# Patient Record
Sex: FEMALE | Race: BLACK OR AFRICAN AMERICAN | NOT HISPANIC OR LATINO | URBAN - METROPOLITAN AREA
[De-identification: names, ages, dates, MRNs, and addresses within clinical notes are randomized per-mention and may not be internally consistent; named-entity substitution may affect disease eponyms.]

---

## 2021-03-04 ENCOUNTER — OFFICE (OUTPATIENT)
Dept: URBAN - METROPOLITAN AREA CLINIC 11 | Facility: CLINIC | Age: 56
End: 2021-03-04

## 2021-03-04 VITALS
HEART RATE: 90 BPM | HEIGHT: 64 IN | OXYGEN SATURATION: 98 % | WEIGHT: 185 LBS | DIASTOLIC BLOOD PRESSURE: 78 MMHG | SYSTOLIC BLOOD PRESSURE: 122 MMHG

## 2021-03-04 DIAGNOSIS — R10.30 LOWER ABDOMINAL PAIN, UNSPECIFIED: ICD-10-CM

## 2021-03-04 DIAGNOSIS — R13.19 OTHER DYSPHAGIA: ICD-10-CM

## 2021-03-04 DIAGNOSIS — R19.5 OTHER FECAL ABNORMALITIES: ICD-10-CM

## 2021-03-04 DIAGNOSIS — K59.00 CONSTIPATION, UNSPECIFIED: ICD-10-CM

## 2021-03-04 DIAGNOSIS — D50.9 IRON DEFICIENCY ANEMIA, UNSPECIFIED: ICD-10-CM

## 2021-03-04 LAB
CBC, PLATELET, NO DIFFERENTIAL: HEMATOCRIT: 45.4 % (ref 34–46.6)
CBC, PLATELET, NO DIFFERENTIAL: HEMOGLOBIN: 15.2 G/DL (ref 11.1–15.9)
CBC, PLATELET, NO DIFFERENTIAL: MCH: 34 PG — HIGH (ref 26.6–33)
CBC, PLATELET, NO DIFFERENTIAL: MCHC: 33.5 G/DL (ref 31.5–35.7)
CBC, PLATELET, NO DIFFERENTIAL: MCV: 102 FL — HIGH (ref 79–97)
CBC, PLATELET, NO DIFFERENTIAL: PLATELETS: 189 X10E3/UL (ref 150–450)
CBC, PLATELET, NO DIFFERENTIAL: RBC: 4.47 X10E6/UL (ref 3.77–5.28)
CBC, PLATELET, NO DIFFERENTIAL: RDW: 13.5 % (ref 11.7–15.4)
CBC, PLATELET, NO DIFFERENTIAL: WBC: 8.6 X10E3/UL (ref 3.4–10.8)
FERRITIN, SERUM: 302 NG/ML — HIGH (ref 15–150)
IRON AND TIBC: IRON BIND.CAP.(TIBC): 322 UG/DL (ref 250–450)
IRON AND TIBC: IRON SATURATION: 29 % (ref 15–55)
IRON AND TIBC: IRON: 93 UG/DL (ref 27–159)
IRON AND TIBC: UIBC: 229 UG/DL (ref 131–425)
TSH: 2.79 UIU/ML (ref 0.45–4.5)

## 2021-03-04 PROCEDURE — 99204 OFFICE O/P NEW MOD 45 MIN: CPT

## 2021-03-04 RX ORDER — SODIUM PICOSULFATE, MAGNESIUM OXIDE, AND ANHYDROUS CITRIC ACID 10; 3.5; 12 MG/160ML; G/160ML; G/160ML
LIQUID ORAL
Qty: 1 | Refills: 0 | Status: COMPLETED
Start: 2021-03-04 | End: 2021-03-22

## 2021-03-04 NOTE — SERVICEHPINOTES
56 y/o female who was referred by P Irene Darden for evaluation of constipation. She has a long history of constipation but states it worsened after her COVID infection in October. She can go days without a bowel movement and have increased bloating and gassiness. In the past, she ate red grapes daily with improved bowel regularity and has recently started eating those again. She has also increased her water intake since the COVID infection and she consumes two 18 oz bottles of water with vitamins (Karma water) daily and additional fluids with coke at lunch and juice breakfast/dinner. Diet contains some but inadequate fiber. She also has lower abdominal pain in the midline that occurs most days of the week, lasting about 1 hour, and described as achy. It occurs in no predictable pattern or time of day and is not specifically associated with eating, movement, or defecation. She also has a history of iron deficiency anemia and has been on iron supplement for years. She is post menopausal. She denies blood donation. She had a black colored stool about 1 month ago that was not tarry or sticky. She had dark brown stool recently, otherwise stool has been brown. She denies hematochezia. No pepto bismol use. She was started on a one month course of diclofenac starting early February for joint pain secondary to COVID. Her last colonoscopy was 12/5/2014 with three 1-2mm hyperplastic rectal polyps removed otherwise normal.  Since her COVID infection, she also has other symptoms including nausea and esophageal dysphagia with the sensation that food can get stuck in lower esophagus, in particular French fries.  The sensation can last hours sometimes before resolving. No true symptoms suggesting obstruction or aspiration. No dysphagia with pills or liquids. She denies pyrosis or regurgitation. She had an EGD about 6 years ago by Dr. Pop and was diagnosed with "reflux" and a hiatal hernia and started on omeprazole. She was on it and then discontinued on her own but restarted again last year and was on it for a few months before she stopped during her COVID infection.

## 2021-03-22 ENCOUNTER — AMBULATORY SURGICAL CENTER (OUTPATIENT)
Dept: URBAN - METROPOLITAN AREA SURGERY 3 | Facility: SURGERY | Age: 56
End: 2021-03-22

## 2021-03-22 ENCOUNTER — OFFICE (OUTPATIENT)
Dept: URBAN - METROPOLITAN AREA PATHOLOGY 22 | Facility: PATHOLOGY | Age: 56
End: 2021-03-22

## 2021-03-22 VITALS
HEART RATE: 59 BPM | HEIGHT: 64 IN | SYSTOLIC BLOOD PRESSURE: 135 MMHG | HEIGHT: 64 IN | TEMPERATURE: 97.5 F | DIASTOLIC BLOOD PRESSURE: 58 MMHG | DIASTOLIC BLOOD PRESSURE: 62 MMHG | DIASTOLIC BLOOD PRESSURE: 77 MMHG | RESPIRATION RATE: 16 BRPM | HEART RATE: 70 BPM | TEMPERATURE: 97 F | RESPIRATION RATE: 16 BRPM | OXYGEN SATURATION: 99 % | OXYGEN SATURATION: 99 % | RESPIRATION RATE: 17 BRPM | RESPIRATION RATE: 19 BRPM | HEART RATE: 59 BPM | RESPIRATION RATE: 17 BRPM | TEMPERATURE: 97 F | OXYGEN SATURATION: 97 % | SYSTOLIC BLOOD PRESSURE: 130 MMHG | SYSTOLIC BLOOD PRESSURE: 107 MMHG | RESPIRATION RATE: 19 BRPM | WEIGHT: 178 LBS | SYSTOLIC BLOOD PRESSURE: 132 MMHG | SYSTOLIC BLOOD PRESSURE: 135 MMHG | HEART RATE: 70 BPM | DIASTOLIC BLOOD PRESSURE: 62 MMHG | HEART RATE: 61 BPM | HEART RATE: 63 BPM | SYSTOLIC BLOOD PRESSURE: 130 MMHG | SYSTOLIC BLOOD PRESSURE: 115 MMHG | SYSTOLIC BLOOD PRESSURE: 115 MMHG | DIASTOLIC BLOOD PRESSURE: 85 MMHG | HEART RATE: 63 BPM | DIASTOLIC BLOOD PRESSURE: 85 MMHG | OXYGEN SATURATION: 97 % | TEMPERATURE: 97.5 F | DIASTOLIC BLOOD PRESSURE: 77 MMHG | WEIGHT: 178 LBS | OXYGEN SATURATION: 98 % | OXYGEN SATURATION: 98 % | SYSTOLIC BLOOD PRESSURE: 107 MMHG | SYSTOLIC BLOOD PRESSURE: 132 MMHG | DIASTOLIC BLOOD PRESSURE: 58 MMHG | HEART RATE: 61 BPM

## 2021-03-22 DIAGNOSIS — D50.9 IRON DEFICIENCY ANEMIA, UNSPECIFIED: ICD-10-CM

## 2021-03-22 DIAGNOSIS — K63.89 OTHER SPECIFIED DISEASES OF INTESTINE: ICD-10-CM

## 2021-03-22 DIAGNOSIS — K63.3 ULCER OF INTESTINE: ICD-10-CM

## 2021-03-22 DIAGNOSIS — K22.8 OTHER SPECIFIED DISEASES OF ESOPHAGUS: ICD-10-CM

## 2021-03-22 PROBLEM — K31.89 OTHER DISEASES OF STOMACH AND DUODENUM: Status: ACTIVE | Noted: 2021-03-22

## 2021-03-22 PROCEDURE — 43239 EGD BIOPSY SINGLE/MULTIPLE: CPT | Mod: 51

## 2021-03-22 PROCEDURE — 88305 TISSUE EXAM BY PATHOLOGIST: CPT

## 2021-03-22 PROCEDURE — 45380 COLONOSCOPY AND BIOPSY: CPT

## 2021-03-22 PROCEDURE — 88342 IMHCHEM/IMCYTCHM 1ST ANTB: CPT

## 2021-03-22 NOTE — SERVICEHPINOTES
Ms. Sepulveda has a history of iron deficiency anemia and has been on iron supplement for years. She is post menopausal. She denies blood donation. She had a black colored stool about 1 month ago that was not tarry or sticky. She had dark brown stool recently, otherwise stool has been brown. She denies hematochezia. No pepto bismol use. She was started on a one month course of diclofenac starting early February for joint pain secondary to COVID. Her last colonoscopy was 12/5/2014 with three 1-2mm hyperplastic rectal polyps removed otherwise normal.

## 2021-04-02 ENCOUNTER — OFFICE (OUTPATIENT)
Dept: URBAN - METROPOLITAN AREA CLINIC 11 | Facility: CLINIC | Age: 56
End: 2021-04-02

## 2021-04-06 PROBLEM — D53.9 UNEXPLAINED IRON DEFICIENCY ANEMIA: Status: ACTIVE | Noted: 2021-03-22

## 2022-03-18 ENCOUNTER — OFFICE (OUTPATIENT)
Dept: URBAN - METROPOLITAN AREA CLINIC 11 | Facility: CLINIC | Age: 57
End: 2022-03-18

## 2022-03-18 VITALS
HEIGHT: 64 IN | WEIGHT: 179 LBS | OXYGEN SATURATION: 99 % | SYSTOLIC BLOOD PRESSURE: 118 MMHG | HEART RATE: 78 BPM | DIASTOLIC BLOOD PRESSURE: 74 MMHG

## 2022-03-18 DIAGNOSIS — K63.3 ULCER OF INTESTINE: ICD-10-CM

## 2022-03-18 DIAGNOSIS — R10.11 RIGHT UPPER QUADRANT PAIN: ICD-10-CM

## 2022-03-18 DIAGNOSIS — R13.19 OTHER DYSPHAGIA: ICD-10-CM

## 2022-03-18 LAB
C-REACTIVE PROTEIN, QUANT: 14 MG/L — HIGH (ref 0–10)
CBC, PLATELET, NO DIFFERENTIAL: HEMATOCRIT: 39.4 % (ref 34–46.6)
CBC, PLATELET, NO DIFFERENTIAL: HEMOGLOBIN: 13.1 G/DL (ref 11.1–15.9)
CBC, PLATELET, NO DIFFERENTIAL: MCH: 32.3 PG (ref 26.6–33)
CBC, PLATELET, NO DIFFERENTIAL: MCHC: 33.2 G/DL (ref 31.5–35.7)
CBC, PLATELET, NO DIFFERENTIAL: MCV: 97 FL (ref 79–97)
CBC, PLATELET, NO DIFFERENTIAL: PLATELETS: 170 X10E3/UL (ref 150–450)
CBC, PLATELET, NO DIFFERENTIAL: RBC: 4.06 X10E6/UL (ref 3.77–5.28)
CBC, PLATELET, NO DIFFERENTIAL: RDW: 13.2 % (ref 11.7–15.4)
CBC, PLATELET, NO DIFFERENTIAL: WBC: 5.1 X10E3/UL (ref 3.4–10.8)
COMP. METABOLIC PANEL (14): A/G RATIO: 1.3 (ref 1.2–2.2)
COMP. METABOLIC PANEL (14): ALBUMIN: 4.2 G/DL (ref 3.8–4.9)
COMP. METABOLIC PANEL (14): ALKALINE PHOSPHATASE: 77 IU/L (ref 44–121)
COMP. METABOLIC PANEL (14): ALT (SGPT): 15 IU/L (ref 0–32)
COMP. METABOLIC PANEL (14): AST (SGOT): 21 IU/L (ref 0–40)
COMP. METABOLIC PANEL (14): BILIRUBIN, TOTAL: 0.6 MG/DL (ref 0–1.2)
COMP. METABOLIC PANEL (14): BUN/CREATININE RATIO: 13 (ref 9–23)
COMP. METABOLIC PANEL (14): BUN: 13 MG/DL (ref 6–24)
COMP. METABOLIC PANEL (14): CALCIUM: 10 MG/DL (ref 8.7–10.2)
COMP. METABOLIC PANEL (14): CARBON DIOXIDE, TOTAL: 30 MMOL/L — HIGH (ref 20–29)
COMP. METABOLIC PANEL (14): CHLORIDE: 98 MMOL/L (ref 96–106)
COMP. METABOLIC PANEL (14): CREATININE: 0.97 MG/DL (ref 0.57–1)
COMP. METABOLIC PANEL (14): EGFR: 68 ML/MIN/1.73 (ref 59–?)
COMP. METABOLIC PANEL (14): GLOBULIN, TOTAL: 3.2 G/DL (ref 1.5–4.5)
COMP. METABOLIC PANEL (14): GLUCOSE: 86 MG/DL (ref 65–99)
COMP. METABOLIC PANEL (14): POTASSIUM: 3.8 MMOL/L (ref 3.5–5.2)
COMP. METABOLIC PANEL (14): PROTEIN, TOTAL: 7.4 G/DL (ref 6–8.5)
COMP. METABOLIC PANEL (14): SODIUM: 141 MMOL/L (ref 134–144)
SEDIMENTATION RATE-WESTERGREN: 16 MM/HR (ref 0–40)

## 2022-03-18 PROCEDURE — 99214 OFFICE O/P EST MOD 30 MIN: CPT | Performed by: NURSE PRACTITIONER

## 2022-03-18 NOTE — SERVICENOTES
We reviewed her past records including EGD, colonoscopy, labs etc and will continue with plan for CT enterography that was discussed with her a year ago for evaluation for possible Crohn's. We discussed her RUQ pain and dysphagia and will plan for EGD. Discussed procedure including r/b/a. With chronic dysphagia and unremarkable EGD a year ago, will plan for esophagram before EGD. Depending on labs and CT findings will determine if colonoscopy should be added to EGD.

## 2022-03-18 NOTE — SERVICEHPINOTES
Ms. Sepulveda is a 56 year old female that presents today for follow up. She notes that she continues to have intermittent abdominal pain in the right side of the abdomen.  It is present in the right upper abdomen on her side in comparison to the lower abdomen. This started around August or September. The pain is described as burning. She notes that it occurs approximately three times per week. She denies presence of aggravating factors. She notes that when the pain becomes severe she will take Aleve or Tylenol with some improvement of her pain. She denies associated symptoms. She notes that she had felt swelling in the right side in December. She was seen by her PCP at that time. She had an ultrasound that she notes was unremarkable. br
br She notes that she does have intermittent frequent stools with sometimes going between 4 and 5 times per day. Bowel movements at that time will be formed and soft. No watery or loose stool. No hematochezia or melena. She notes that this will occur if she eats spicy type foods. br
br She notes that she has nausea that has been present for the past year. She notes that this occurs intermittently. It will occur multiple times per week. She notes that it can last for hours at a time. She notes that eating a pickle or drinking ginger ale alleviates her nausea. She notes she recently had one episode of vomiting within the past. She notes that it occurred randomly during the evening time. Emesis consisted of food that she had eaten several hours before. No particular aggravating factors. She notes that she has early satiety as well. She notes that she has reflux intermittently if she eats french fries without drinking a beverage. This does not occur often. She denies presence of heartburn, epigastric pain. She notes that she previously had issues with dysphagia after eating certain foods. She notes that she then had the EGD. She denies recent dysphagia as long as she eats slowly. Although today, she feels as if something is stuck in her chest area. She continues to take Omeprazole daily. 
caitlin tucker
3/22/21: EGD/Colonoscopy: ulceration and erythema in the ileo-cecal vaive with biopsies consistent with mild to moderate chronic active inflammation
br caitlin 4/2/21 IBD expanded panel unremarkable, elevated CRP of 11
br

## 2022-03-21 ENCOUNTER — AMBULATORY SURGICAL CENTER (OUTPATIENT)
Dept: URBAN - METROPOLITAN AREA SURGERY 3 | Facility: SURGERY | Age: 57
End: 2022-03-21

## 2022-03-21 ENCOUNTER — OFFICE (OUTPATIENT)
Dept: URBAN - METROPOLITAN AREA PATHOLOGY 22 | Facility: PATHOLOGY | Age: 57
End: 2022-03-21

## 2022-03-21 VITALS
DIASTOLIC BLOOD PRESSURE: 71 MMHG | SYSTOLIC BLOOD PRESSURE: 132 MMHG | OXYGEN SATURATION: 96 % | TEMPERATURE: 97.8 F | RESPIRATION RATE: 18 BRPM | DIASTOLIC BLOOD PRESSURE: 84 MMHG | OXYGEN SATURATION: 95 % | TEMPERATURE: 97.3 F | DIASTOLIC BLOOD PRESSURE: 89 MMHG | DIASTOLIC BLOOD PRESSURE: 76 MMHG | OXYGEN SATURATION: 95 % | RESPIRATION RATE: 18 BRPM | TEMPERATURE: 97.8 F | DIASTOLIC BLOOD PRESSURE: 71 MMHG | SYSTOLIC BLOOD PRESSURE: 117 MMHG | RESPIRATION RATE: 16 BRPM | SYSTOLIC BLOOD PRESSURE: 144 MMHG | RESPIRATION RATE: 21 BRPM | RESPIRATION RATE: 20 BRPM | WEIGHT: 177 LBS | SYSTOLIC BLOOD PRESSURE: 133 MMHG | HEART RATE: 72 BPM | OXYGEN SATURATION: 95 % | HEART RATE: 72 BPM | DIASTOLIC BLOOD PRESSURE: 76 MMHG | TEMPERATURE: 97.3 F | RESPIRATION RATE: 20 BRPM | OXYGEN SATURATION: 97 % | OXYGEN SATURATION: 98 % | RESPIRATION RATE: 21 BRPM | SYSTOLIC BLOOD PRESSURE: 117 MMHG | HEART RATE: 70 BPM | RESPIRATION RATE: 21 BRPM | DIASTOLIC BLOOD PRESSURE: 89 MMHG | SYSTOLIC BLOOD PRESSURE: 117 MMHG | HEIGHT: 64 IN | TEMPERATURE: 97.8 F | RESPIRATION RATE: 16 BRPM | HEIGHT: 64 IN | HEIGHT: 64 IN | OXYGEN SATURATION: 98 % | SYSTOLIC BLOOD PRESSURE: 144 MMHG | DIASTOLIC BLOOD PRESSURE: 76 MMHG | WEIGHT: 177 LBS | DIASTOLIC BLOOD PRESSURE: 74 MMHG | SYSTOLIC BLOOD PRESSURE: 144 MMHG | SYSTOLIC BLOOD PRESSURE: 133 MMHG | HEART RATE: 72 BPM | SYSTOLIC BLOOD PRESSURE: 133 MMHG | OXYGEN SATURATION: 97 % | HEART RATE: 64 BPM | HEART RATE: 64 BPM | SYSTOLIC BLOOD PRESSURE: 132 MMHG | SYSTOLIC BLOOD PRESSURE: 132 MMHG | RESPIRATION RATE: 16 BRPM | OXYGEN SATURATION: 97 % | OXYGEN SATURATION: 96 % | DIASTOLIC BLOOD PRESSURE: 84 MMHG | RESPIRATION RATE: 20 BRPM | OXYGEN SATURATION: 91 % | OXYGEN SATURATION: 91 % | OXYGEN SATURATION: 91 % | RESPIRATION RATE: 18 BRPM | WEIGHT: 177 LBS | HEART RATE: 64 BPM | DIASTOLIC BLOOD PRESSURE: 74 MMHG | DIASTOLIC BLOOD PRESSURE: 89 MMHG | OXYGEN SATURATION: 96 % | HEART RATE: 70 BPM | DIASTOLIC BLOOD PRESSURE: 74 MMHG | HEART RATE: 70 BPM | OXYGEN SATURATION: 98 % | DIASTOLIC BLOOD PRESSURE: 84 MMHG | DIASTOLIC BLOOD PRESSURE: 71 MMHG | TEMPERATURE: 97.3 F

## 2022-03-21 DIAGNOSIS — R10.11 RIGHT UPPER QUADRANT PAIN: ICD-10-CM

## 2022-03-21 DIAGNOSIS — K22.2 ESOPHAGEAL OBSTRUCTION: ICD-10-CM

## 2022-03-21 DIAGNOSIS — K29.50 UNSPECIFIED CHRONIC GASTRITIS WITHOUT BLEEDING: ICD-10-CM

## 2022-03-21 PROCEDURE — 43239 EGD BIOPSY SINGLE/MULTIPLE: CPT | Performed by: INTERNAL MEDICINE

## 2022-03-21 PROCEDURE — 88305 TISSUE EXAM BY PATHOLOGIST: CPT | Performed by: STUDENT IN AN ORGANIZED HEALTH CARE EDUCATION/TRAINING PROGRAM

## 2022-03-21 PROCEDURE — 88313 SPECIAL STAINS GROUP 2: CPT | Performed by: STUDENT IN AN ORGANIZED HEALTH CARE EDUCATION/TRAINING PROGRAM

## 2022-03-21 PROCEDURE — 88342 IMHCHEM/IMCYTCHM 1ST ANTB: CPT | Performed by: STUDENT IN AN ORGANIZED HEALTH CARE EDUCATION/TRAINING PROGRAM

## 2022-03-21 PROCEDURE — 43450 DILATE ESOPHAGUS 1/MULT PASS: CPT | Mod: 51 | Performed by: INTERNAL MEDICINE

## 2022-04-01 ENCOUNTER — OFFICE (OUTPATIENT)
Dept: URBAN - METROPOLITAN AREA CLINIC 22 | Facility: CLINIC | Age: 57
End: 2022-04-01

## 2022-04-01 DIAGNOSIS — K76.89 OTHER SPECIFIED DISEASES OF LIVER: ICD-10-CM

## 2022-04-01 DIAGNOSIS — R10.9 UNSPECIFIED ABDOMINAL PAIN: ICD-10-CM

## 2022-04-01 PROCEDURE — 74177 CT ABD & PELVIS W/CONTRAST: CPT | Mod: TC | Performed by: NURSE PRACTITIONER

## 2022-05-12 ENCOUNTER — OFFICE (OUTPATIENT)
Dept: URBAN - METROPOLITAN AREA CLINIC 11 | Facility: CLINIC | Age: 57
End: 2022-05-12

## 2022-05-12 VITALS
HEART RATE: 98 BPM | SYSTOLIC BLOOD PRESSURE: 122 MMHG | OXYGEN SATURATION: 98 % | WEIGHT: 181 LBS | DIASTOLIC BLOOD PRESSURE: 77 MMHG | HEIGHT: 64 IN

## 2022-05-12 DIAGNOSIS — R13.19 OTHER DYSPHAGIA: ICD-10-CM

## 2022-05-12 DIAGNOSIS — Q44.6 CYSTIC DISEASE OF LIVER: ICD-10-CM

## 2022-05-12 PROCEDURE — 99214 OFFICE O/P EST MOD 30 MIN: CPT | Performed by: INTERNAL MEDICINE

## 2022-05-12 RX ORDER — OMEPRAZOLE 40 MG/1
80 CAPSULE, DELAYED RELEASE ORAL
Qty: 180 | Refills: 3 | Status: ACTIVE

## 2022-05-12 NOTE — SERVICENOTES
We discussed her liver cysts, changes, normal LFTS, likely polycystic liver disease and typical course/risks of advancement.  As to her right flank pain this seem to be muscular related and noting her studies we discussed core muscular toning and possibly accupuncture.  Additionally with her reflux and dysphagia, we discussed her rings and UGI findings of dysmotility.  I would like to treat her for her reflux iwth BID PPIs and also do the manometry to r/o primary dysmotility issues/achalasia (dilated esophagus on the esophagram).

## 2022-05-12 NOTE — SERVICEHPINOTES
Pt presents for f/u after her EGD with dilation of an upper esophageal ring.  She has still had some issues with dysphagia. She has been treated for her reflux and has stopped eating late and lying down.  She has still had a feeling of food slowing or sticking in her mid chest after eating.  This can occur with meats or even other solid foods. The feeling is intermittent. She has not had issue with softer foods.  
br
caitlin She had an esophagram with some dysmotility issues.  
br
caitlin She has also still had some issues with a RUQ pain in the later part of her chest/underneath her ribs.  The pain comes and goes.  It has been present for over a year. This can feel like a "know" with bending over. She has not had meal or GI activity related issues. br
caitlin She had several cysts on her liver with some enlargement of a few of them.

## 2022-06-13 ENCOUNTER — OFFICE (OUTPATIENT)
Dept: URBAN - METROPOLITAN AREA CLINIC 11 | Facility: CLINIC | Age: 57
End: 2022-06-13

## 2022-06-13 DIAGNOSIS — R13.19 OTHER DYSPHAGIA: ICD-10-CM

## 2022-06-13 PROCEDURE — 91037 ESOPH IMPED FUNCTION TEST: CPT | Performed by: INTERNAL MEDICINE

## 2022-06-13 PROCEDURE — 91010 ESOPHAGUS MOTILITY STUDY: CPT | Performed by: INTERNAL MEDICINE

## 2022-10-17 ENCOUNTER — OFFICE (OUTPATIENT)
Dept: URBAN - METROPOLITAN AREA CLINIC 11 | Facility: CLINIC | Age: 57
End: 2022-10-17

## 2022-10-17 VITALS — WEIGHT: 180 LBS | OXYGEN SATURATION: 98 % | HEIGHT: 64 IN | HEART RATE: 68 BPM | SYSTOLIC BLOOD PRESSURE: 125 MMHG

## 2022-10-17 DIAGNOSIS — K21.9 GASTRO-ESOPHAGEAL REFLUX DISEASE WITHOUT ESOPHAGITIS: ICD-10-CM

## 2022-10-17 DIAGNOSIS — K58.1 IRRITABLE BOWEL SYNDROME WITH CONSTIPATION: ICD-10-CM

## 2022-10-17 PROCEDURE — 99214 OFFICE O/P EST MOD 30 MIN: CPT | Performed by: INTERNAL MEDICINE

## 2022-10-17 RX ORDER — LUBIPROSTONE 8 UG/1
8 CAPSULE, GELATIN COATED ORAL
Qty: 30 | Refills: 11 | Status: ACTIVE
Start: 2022-10-17

## 2022-10-17 NOTE — SERVICENOTES
Her GERD and esophageal spasms have been well controlled on meds.  We discussed her manometry studies and long term tx with PPIs.  As to her constipation, she seems to have IBS-C and given diarrhea with Linzess in the past (likely 145s), we can try Amitiza.  We discussed how to take it, use of fiber supplements, extra water intake, and exercise.

## 2022-10-17 NOTE — SERVICEHPINOTES
Pt presents for f/u of her GERD and esophageal spasm.  She stated that she has bee doing well on her meds.  She thought that she was not having reflux issues and has not had the dysphagia on the BID meds.
br
br She stated that she has been having difficulties with constipation and then diarrhea.  She stated that she will have 3-4 stools in a day preceded by abdominal pain and last week with some nausea in the mornings.  She will then skip about 3-4 days without a stool. She stated that this started sometime after having covid.  She has not had bleeding or acute pain. She has had some sharper pain with cramping that will hit and then pass. She has not been taking meds for constipation. She has tried to drink more water.
br
br Of note she had been on LInzess years ago for IBS-C.  It had helped but caused some diarrhea.